# Patient Record
Sex: MALE | Race: WHITE | NOT HISPANIC OR LATINO | URBAN - METROPOLITAN AREA
[De-identification: names, ages, dates, MRNs, and addresses within clinical notes are randomized per-mention and may not be internally consistent; named-entity substitution may affect disease eponyms.]

---

## 2022-06-08 ENCOUNTER — OFFICE VISIT (OUTPATIENT)
Dept: URBAN - METROPOLITAN AREA CLINIC 63 | Facility: CLINIC | Age: 62
End: 2022-06-08

## 2022-07-09 ENCOUNTER — TELEPHONE ENCOUNTER (OUTPATIENT)
Dept: URBAN - METROPOLITAN AREA CLINIC 121 | Facility: CLINIC | Age: 62
End: 2022-07-09

## 2022-07-09 RX ORDER — MELOXICAM 15 MG/1
TABLET ORAL
Refills: 0 | OUTPATIENT
Start: 2021-10-28 | End: 2022-06-08

## 2022-07-09 RX ORDER — GABAPENTIN 100 MG/1
CAPSULE ORAL
Refills: 0 | OUTPATIENT
Start: 2021-07-12 | End: 2022-06-08

## 2022-07-09 RX ORDER — OMEPRAZOLE 40 MG/1
CAPSULE, DELAYED RELEASE ORAL
Refills: 0 | OUTPATIENT
Start: 2022-01-27 | End: 2022-06-08

## 2022-07-09 RX ORDER — ESCITALOPRAM 10 MG/1
TABLET, FILM COATED ORAL
Refills: 0 | OUTPATIENT
Start: 2022-06-06 | End: 2022-06-08

## 2022-07-10 ENCOUNTER — TELEPHONE ENCOUNTER (OUTPATIENT)
Dept: URBAN - METROPOLITAN AREA CLINIC 121 | Facility: CLINIC | Age: 62
End: 2022-07-10

## 2022-07-10 RX ORDER — ESCITALOPRAM 10 MG/1
TABLET, FILM COATED ORAL ONCE A DAY
Refills: 0 | Status: ACTIVE | COMMUNITY
Start: 2022-06-08

## 2022-07-10 RX ORDER — OMEPRAZOLE 40 MG/1
CAPSULE, DELAYED RELEASE ORAL ONCE A DAY
Refills: 0 | Status: ACTIVE | COMMUNITY
Start: 2022-06-08

## 2022-08-03 PROBLEM — 698065002 ACID REFLUX: Status: ACTIVE | Noted: 2022-08-03

## 2022-08-05 ENCOUNTER — WEB ENCOUNTER (OUTPATIENT)
Dept: URBAN - METROPOLITAN AREA SURGERY CENTER 4 | Facility: SURGERY CENTER | Age: 62
End: 2022-08-05

## 2022-08-10 ENCOUNTER — CLAIMS CREATED FROM THE CLAIM WINDOW (OUTPATIENT)
Dept: URBAN - METROPOLITAN AREA CLINIC 4 | Facility: CLINIC | Age: 62
End: 2022-08-10
Payer: COMMERCIAL

## 2022-08-10 ENCOUNTER — OFFICE VISIT (OUTPATIENT)
Dept: URBAN - METROPOLITAN AREA SURGERY CENTER 4 | Facility: SURGERY CENTER | Age: 62
End: 2022-08-10
Payer: COMMERCIAL

## 2022-08-10 DIAGNOSIS — K21.9 GASTRO-ESOPHAGEAL REFLUX DISEASE WITHOUT ESOPHAGITIS: ICD-10-CM

## 2022-08-10 DIAGNOSIS — K90.0 CELIAC DISEASE: ICD-10-CM

## 2022-08-10 DIAGNOSIS — K63.89 OTHER SPECIFIED DISEASES OF INTESTINE: ICD-10-CM

## 2022-08-10 DIAGNOSIS — K31.7 POLYP OF STOMACH AND DUODENUM: ICD-10-CM

## 2022-08-10 DIAGNOSIS — K22.2 SCHATZKI'S RING: ICD-10-CM

## 2022-08-10 DIAGNOSIS — D12.5 SIGMOID POLYP: ICD-10-CM

## 2022-08-10 DIAGNOSIS — K50.00 CROHN'S DISEASE OF SMALL INTESTINE WITHOUT COMPLICATIONS: ICD-10-CM

## 2022-08-10 DIAGNOSIS — K31.7 GASTRIC POLYP: ICD-10-CM

## 2022-08-10 DIAGNOSIS — R19.7 DIARRHEA: ICD-10-CM

## 2022-08-10 DIAGNOSIS — K64.1 GRADE II INTERNAL HEMORRHOIDS: ICD-10-CM

## 2022-08-10 DIAGNOSIS — K57.30 DIVERTCULOSIS OF LG INT W/O PERFORATION OR ABSCESS W/O BLEEDING: ICD-10-CM

## 2022-08-10 DIAGNOSIS — K20.90 ESOPHAGITIS: ICD-10-CM

## 2022-08-10 PROCEDURE — 88312 SPECIAL STAINS GROUP 1: CPT | Performed by: PATHOLOGY

## 2022-08-10 PROCEDURE — 88305 TISSUE EXAM BY PATHOLOGIST: CPT | Performed by: PATHOLOGY

## 2022-08-10 PROCEDURE — 88342 IMHCHEM/IMCYTCHM 1ST ANTB: CPT | Performed by: PATHOLOGY

## 2022-08-10 PROCEDURE — 45380 COLONOSCOPY AND BIOPSY: CPT | Performed by: INTERNAL MEDICINE

## 2022-08-10 PROCEDURE — G8907 PT DOC NO EVENTS ON DISCHARG: HCPCS | Performed by: INTERNAL MEDICINE

## 2022-08-10 PROCEDURE — 43239 EGD BIOPSY SINGLE/MULTIPLE: CPT | Performed by: INTERNAL MEDICINE

## 2022-08-10 PROCEDURE — G8918 PT W/O PREOP ORDER IV AB PRO: HCPCS | Performed by: INTERNAL MEDICINE

## 2022-08-15 PROBLEM — 66889002 SCHATZKI'S RING: Status: ACTIVE | Noted: 2022-08-15

## 2022-08-15 PROBLEM — 78809005 GASTRIC POLYP: Status: ACTIVE | Noted: 2022-08-15

## 2022-08-15 PROBLEM — 398050005 DIVERTICULAR DISEASE OF COLON: Status: ACTIVE | Noted: 2022-08-15

## 2022-09-21 ENCOUNTER — DASHBOARD ENCOUNTERS (OUTPATIENT)
Age: 62
End: 2022-09-21

## 2022-09-22 ENCOUNTER — CLAIMS CREATED FROM THE CLAIM WINDOW (OUTPATIENT)
Dept: URBAN - METROPOLITAN AREA TELEHEALTH 1 | Facility: TELEHEALTH | Age: 62
End: 2022-09-22
Payer: COMMERCIAL

## 2022-09-22 ENCOUNTER — LAB OUTSIDE AN ENCOUNTER (OUTPATIENT)
Dept: URBAN - METROPOLITAN AREA TELEHEALTH 1 | Facility: TELEHEALTH | Age: 62
End: 2022-09-22

## 2022-09-22 DIAGNOSIS — K90.0 CELIAC DISEASE: ICD-10-CM

## 2022-09-22 DIAGNOSIS — K50.00 CROHN'S DISEASE OF ILEUM WITHOUT COMPLICATION: ICD-10-CM

## 2022-09-22 PROBLEM — 396331005 CELIAC DISEASE: Status: ACTIVE | Noted: 2022-08-15

## 2022-09-22 PROBLEM — 38106008: Status: ACTIVE | Noted: 2022-09-22

## 2022-09-22 PROCEDURE — 99214 OFFICE O/P EST MOD 30 MIN: CPT | Performed by: NURSE PRACTITIONER

## 2022-09-22 RX ORDER — OMEPRAZOLE 40 MG/1
1 CAPSULE 30 MINUTES BEFORE MORNING MEAL CAPSULE, DELAYED RELEASE ORAL ONCE A DAY
Qty: 90 | Refills: 3

## 2022-09-22 RX ORDER — OMEPRAZOLE 40 MG/1
CAPSULE, DELAYED RELEASE ORAL ONCE A DAY
Refills: 0 | COMMUNITY
Start: 2022-06-08

## 2022-09-22 NOTE — HPI-TODAY'S VISIT:
Diego is a pleasant 61-year-old male here in follow-up from EGD and colonoscopy.  EGD was notable for villous blunting consistent with celiac disease.  Colonoscopy notable for mild ileitis with biopsies suggesting Crohn's disease.  Normal colon with hyperplastic polyps.  Unremarkable biopsies for microscopic colitis..  He has been having issues with diarrhea for approximately 5 months.  Prior to that he was having 1-2 formed bowel movements daily.  No prior history of celiac disease or Crohn's disease despite having a colonoscopy and endoscopy in 2016 per his recollection.  He did not complete the stool work-up ordered last office visit.  He has no nocturnal symptoms of diarrhea no blood in the stool dark stools or tarry stools. Taking omeprazole with good control of acid reflux.

## 2022-09-22 NOTE — HPI-PREVIOUS PROCEDURES
Endoscopy 8/2022 LA grade a esophagitis biopsies negative for Ley's, nonobstructing Schatzki's ring, 2 cm hiatal hernia, localized moderately congested mucosa in the gastric body biopsies negative for dysplasia or H. pylori, single small polyp in the gastric body biopsies fundic gland polyp.  Duodenal bulb first and second portion of the duodenum biopsies consistent mild villous blunting with prominent inferior epithelial lymphocytes consistent with sprue. Colonoscopy 8/2022 patchy inflammation mild in severity with erosions, erythema in the distal ileum and terminal ileum biopsies chronic active ileitis with aphthous ulcer and pyloric gland metaplasia consistent with Crohn's disease negative for dysplasia or malignancy, entire colon appeared normal with unremarkable biopsies for microscopic colitis, 6 mm polyp in the sigmoid colon pathology hyperplastic polyp.  Grade 2 hemorrhoids.

## 2022-09-26 ENCOUNTER — WEB ENCOUNTER (OUTPATIENT)
Dept: URBAN - METROPOLITAN AREA CLINIC 63 | Facility: CLINIC | Age: 62
End: 2022-09-26

## 2022-09-26 RX ORDER — OMEPRAZOLE 40 MG/1
1 CAPSULE 30 MINUTES BEFORE MORNING MEAL CAPSULE, DELAYED RELEASE ORAL ONCE A DAY
Qty: 90 | Refills: 3

## 2022-10-18 ENCOUNTER — LAB OUTSIDE AN ENCOUNTER (OUTPATIENT)
Dept: URBAN - METROPOLITAN AREA CLINIC 63 | Facility: CLINIC | Age: 62
End: 2022-10-18

## 2022-10-19 ENCOUNTER — LAB OUTSIDE AN ENCOUNTER (OUTPATIENT)
Dept: URBAN - METROPOLITAN AREA CLINIC 63 | Facility: CLINIC | Age: 62
End: 2022-10-19

## 2022-10-23 LAB
A/G RATIO: 2.1
ALBUMIN: 4.8
ALKALINE PHOSPHATASE: 104
ALT (SGPT): 24
AST (SGOT): 21
BILIRUBIN, TOTAL: 0.8
BUN/CREATININE RATIO: (no result)
BUN: 9
C-REACTIVE PROTEIN, QUANT: 3.5
CALCIUM: 9.9
CARBON DIOXIDE, TOTAL: 31
CHLORIDE: 101
CREATININE: 0.8
EGFR: 100
FERRITIN, SERUM: 70
GLOBULIN, TOTAL: 2.3
GLUCOSE: 74
HEMATOCRIT: 48.2
HEMOGLOBIN: 16.6
HEP B CORE AB, IGM: (no result)
HEPATITIS B SURFACE ANTIGEN: (no result)
IMMUNOGLOBULIN A, QN, SERUM: 201
INTERPRETATION: (no result)
IRON BIND.CAP.(TIBC): 384
IRON SATURATION: 33
IRON: 128
MCH: 29.3
MCHC: 34.4
MCV: 85.2
MITOGEN-NIL: >10
MPV: 12.1
NIL: 0.03
PLATELET COUNT: 232
POTASSIUM: 4.7
PROTEIN, TOTAL: 7.1
QUANTIFERON(R)-TB GOLD: NEGATIVE
RDW: 12.7
RED BLOOD CELL COUNT: 5.66
SODIUM: 138
T-TRANSGLUTAMINASE (TTG) IGA: <1
TB1-NIL: 0.05
TB2-NIL: 0.04
VITAMIN B12: 573
WHITE BLOOD CELL COUNT: 6.7

## 2022-10-28 LAB
CALPROTECTIN, FECAL: 113
LACTOFERRIN, FECAL, QUANT.: 16

## 2022-11-13 ENCOUNTER — WEB ENCOUNTER (OUTPATIENT)
Dept: URBAN - METROPOLITAN AREA CLINIC 63 | Facility: CLINIC | Age: 62
End: 2022-11-13

## 2025-03-13 ENCOUNTER — LAB OUTSIDE AN ENCOUNTER (OUTPATIENT)
Dept: URBAN - METROPOLITAN AREA CLINIC 63 | Facility: CLINIC | Age: 65
End: 2025-03-13

## 2025-03-13 ENCOUNTER — OFFICE VISIT (OUTPATIENT)
Dept: URBAN - METROPOLITAN AREA CLINIC 63 | Facility: CLINIC | Age: 65
End: 2025-03-13
Payer: COMMERCIAL

## 2025-03-13 VITALS
BODY MASS INDEX: 27.49 KG/M2 | HEIGHT: 65 IN | TEMPERATURE: 97 F | HEART RATE: 72 BPM | DIASTOLIC BLOOD PRESSURE: 85 MMHG | WEIGHT: 165 LBS | SYSTOLIC BLOOD PRESSURE: 145 MMHG | OXYGEN SATURATION: 97 %

## 2025-03-13 DIAGNOSIS — K29.90 GASTRITIS/DUODENITIS: ICD-10-CM

## 2025-03-13 DIAGNOSIS — K21.9 GERD WITHOUT ESOPHAGITIS: ICD-10-CM

## 2025-03-13 DIAGNOSIS — K50.00 CROHN'S DISEASE OF ILEUM WITHOUT COMPLICATION: ICD-10-CM

## 2025-03-13 PROBLEM — 196731005: Status: ACTIVE | Noted: 2025-03-13

## 2025-03-13 PROBLEM — 266435005: Status: ACTIVE | Noted: 2025-03-13

## 2025-03-13 PROCEDURE — 99204 OFFICE O/P NEW MOD 45 MIN: CPT

## 2025-03-13 RX ORDER — OMEPRAZOLE 40 MG/1
1 CAPSULE 30 MINUTES BEFORE MORNING MEAL CAPSULE, DELAYED RELEASE ORAL ONCE A DAY
Qty: 90 | Refills: 3 | Status: ACTIVE | COMMUNITY

## 2025-03-13 RX ORDER — OMEPRAZOLE 40 MG/1
1 CAPSULE 30 MINUTES BEFORE MORNING MEAL CAPSULE, DELAYED RELEASE ORAL ONCE A DAY
Qty: 90 | Refills: 3 | OUTPATIENT
Start: 2025-03-13

## 2025-03-13 NOTE — HPI-PREVIOUS LABS
9/10/2022 fecal calprotectin 113, lactoferrin 16, CMP, CBC, iron with ferritin, B12 folate within normal limits, hep B TB negative

## 2025-03-13 NOTE — HPI-ZZZTODAY'S VISIT
Patient is a very pleasant 64-year-old male who presents for medication refill.  He is a patient of Dr. Ferguson.  Last seen by Milton Zimmerman PA-C 9/22/2022.  Past medical history significant for celiac disease on EGD, colonoscopy notable for mild ileitis with biopsies suggesting Crohn's disease, chronic diarrhea, GERD.  Family history noncontributory. Last colonoscopy 8/10/2022.  Recall for colonoscopy 5 to 10 years prior to biopsy report.  Due to possible findings of Crohn's disease may need to do procedure sooner.  Last endoscopy 8/10/2022 with no indication for recall at that time. egd / colon for chronic diarrhea - noted normal duodenum with path noting villous blunting and increased intraepithelia lymphocytes suggesting  celiac but with celiac serologies coming back unremarkable  Patient presents for refill of omeprazole.  He relates he has had acid reflux since he was a child and the lifting that seems to help his omeprazole 40 mg.  He takes this at night with good efficacy.  If he misses a dose he gets pyrosis.  Used to get dysphagia but on omeprazole and this has also resolved.  In regard to his lower bowels he has normal bowel habits daily with 2 normal bowel movements with no blood.  He is grossly asymptomatic from a GI perspective.  In regard to previous findings on endoscopy and colonoscopy he does not recall these findings.  He does admit he would be very disappointed if he was celiac as he will just eat bread and continues to eat this daily.  He denies dysphagia, dyspepsia, pyrosis, abdominal pain, change in bowel habits, unintentional weight loss, melena, hematochezia.

## 2025-03-13 NOTE — HPI-PREVIOUS IMAGING
11/7/2022 CT enterography for Crohn's disease consistent with normal small bowel and appendix moderate sigmoid diverticulosis

## 2025-06-30 ENCOUNTER — OFFICE VISIT (OUTPATIENT)
Dept: URBAN - METROPOLITAN AREA SURGERY CENTER 4 | Facility: SURGERY CENTER | Age: 65
End: 2025-06-30

## 2025-07-14 ENCOUNTER — OFFICE VISIT (OUTPATIENT)
Dept: URBAN - METROPOLITAN AREA CLINIC 63 | Facility: CLINIC | Age: 65
End: 2025-07-14